# Patient Record
Sex: FEMALE | Race: BLACK OR AFRICAN AMERICAN | NOT HISPANIC OR LATINO | Employment: OTHER | URBAN - METROPOLITAN AREA
[De-identification: names, ages, dates, MRNs, and addresses within clinical notes are randomized per-mention and may not be internally consistent; named-entity substitution may affect disease eponyms.]

---

## 2024-05-29 ENCOUNTER — TELEPHONE (OUTPATIENT)
Dept: GASTROENTEROLOGY | Facility: CLINIC | Age: 59
End: 2024-05-29
Payer: MEDICARE

## 2024-05-29 NOTE — TELEPHONE ENCOUNTER
----- Message from Barbara Hayes sent at 5/29/2024 12:56 PM CDT -----  Regarding: Return call  Contact: [phone]  Type:  Patient Returning Call    Who Called:Yun  Who Left Message for Patient:Sadie  Does the patient know what this is regarding?:Appt  Would the patient rather a call back or a response via [facility]? Call  Best Call Back Number: [phone]  Additional Information:

## 2024-05-29 NOTE — TELEPHONE ENCOUNTER
----- Message from Vanesa Monroy sent at 5/29/2024  9:33 AM CDT -----  Sooner Apoointment Request    Caller is requesting a sooner appointment.  Caller declined first available appointment listed below.  Caller will not accept being placed on the waitlist and is requesting a message be sent to doctor.  Name of Caller:PT  When is the first available appointment?N/A  Symptoms:abdominal pain/stomach pain  Would the patient rather a call back or a response via [facility]? CALL  Best Call Back Number:.No relevant phone numbers on file.      Additional Information: Pt requesting sooner appt, pain is becoming unbearable. Please advise thank you